# Patient Record
Sex: MALE | Race: WHITE | Employment: FULL TIME | URBAN - METROPOLITAN AREA
[De-identification: names, ages, dates, MRNs, and addresses within clinical notes are randomized per-mention and may not be internally consistent; named-entity substitution may affect disease eponyms.]

---

## 2017-01-07 ENCOUNTER — OFFICE VISIT (OUTPATIENT)
Dept: FAMILY MEDICINE CLINIC | Age: 52
End: 2017-01-07

## 2017-01-07 VITALS
DIASTOLIC BLOOD PRESSURE: 84 MMHG | WEIGHT: 270 LBS | RESPIRATION RATE: 16 BRPM | SYSTOLIC BLOOD PRESSURE: 128 MMHG | TEMPERATURE: 97 F | BODY MASS INDEX: 40.92 KG/M2 | OXYGEN SATURATION: 98 % | HEIGHT: 68 IN | HEART RATE: 76 BPM

## 2017-01-07 DIAGNOSIS — E29.1 HYPOGONADISM MALE: ICD-10-CM

## 2017-01-07 DIAGNOSIS — R73.03 PREDIABETES: ICD-10-CM

## 2017-01-07 DIAGNOSIS — I10 ESSENTIAL HYPERTENSION: ICD-10-CM

## 2017-01-07 DIAGNOSIS — J18.9 PNEUMONIA DUE TO INFECTIOUS ORGANISM, UNSPECIFIED LATERALITY, UNSPECIFIED PART OF LUNG: Primary | ICD-10-CM

## 2017-01-07 DIAGNOSIS — E78.2 MIXED HYPERLIPIDEMIA: ICD-10-CM

## 2017-01-07 PROCEDURE — 99214 OFFICE O/P EST MOD 30 MIN: CPT | Performed by: FAMILY MEDICINE

## 2017-01-07 RX ORDER — ACETAMINOPHEN AND CODEINE PHOSPHATE 300; 30 MG/1; MG/1
TABLET ORAL
Refills: 0 | COMMUNITY
Start: 2016-12-22 | End: 2018-06-04 | Stop reason: ALTCHOICE

## 2017-01-07 RX ORDER — CODEINE PHOSPHATE AND GUAIFENESIN 10; 100 MG/5ML; MG/5ML
5 SOLUTION ORAL 3 TIMES DAILY PRN
Qty: 420 ML | Refills: 0 | Status: SHIPPED | OUTPATIENT
Start: 2017-01-07 | End: 2018-06-04 | Stop reason: SDUPTHER

## 2017-01-07 RX ORDER — AMOXICILLIN AND CLAVULANATE POTASSIUM 875; 125 MG/1; MG/1
TABLET, FILM COATED ORAL
Refills: 0 | COMMUNITY
Start: 2016-12-28 | End: 2017-01-07 | Stop reason: ALTCHOICE

## 2017-01-07 RX ORDER — GUAIFENESIN AND CODEINE PHOSPHATE 100; 10 MG/5ML; MG/5ML
SYRUP ORAL
Refills: 0 | COMMUNITY
Start: 2016-12-17 | End: 2018-06-04 | Stop reason: SDUPTHER

## 2017-01-07 RX ORDER — ALBUTEROL SULFATE 2.5 MG/3ML
SOLUTION RESPIRATORY (INHALATION)
Refills: 1 | COMMUNITY
Start: 2016-12-20 | End: 2018-06-04 | Stop reason: ALTCHOICE

## 2017-01-07 RX ORDER — LANOLIN ALCOHOL/MO/W.PET/CERES
3 CREAM (GRAM) TOPICAL
COMMUNITY
Start: 2016-12-28 | End: 2018-06-04 | Stop reason: ALTCHOICE

## 2017-01-07 RX ORDER — MONTELUKAST SODIUM 10 MG/1
TABLET ORAL
Refills: 0 | COMMUNITY
Start: 2016-12-13 | End: 2018-06-04 | Stop reason: ALTCHOICE

## 2017-01-07 RX ORDER — PREDNISONE 20 MG/1
TABLET ORAL
Refills: 0 | COMMUNITY
Start: 2017-01-04 | End: 2018-06-04 | Stop reason: SDUPTHER

## 2017-03-17 DIAGNOSIS — I10 ESSENTIAL HYPERTENSION: ICD-10-CM

## 2017-03-18 RX ORDER — NEBIVOLOL HYDROCHLORIDE 5 MG/1
TABLET ORAL
Qty: 30 TABLET | Refills: 5 | Status: SHIPPED | OUTPATIENT
Start: 2017-03-18

## 2018-06-04 ENCOUNTER — OFFICE VISIT (OUTPATIENT)
Dept: FAMILY MEDICINE CLINIC | Age: 53
End: 2018-06-04
Payer: COMMERCIAL

## 2018-06-04 VITALS
WEIGHT: 245 LBS | BODY MASS INDEX: 37.13 KG/M2 | HEIGHT: 68 IN | RESPIRATION RATE: 16 BRPM | HEART RATE: 72 BPM | TEMPERATURE: 98.1 F | SYSTOLIC BLOOD PRESSURE: 126 MMHG | DIASTOLIC BLOOD PRESSURE: 80 MMHG

## 2018-06-04 DIAGNOSIS — L82.1 SEBORRHEIC KERATOSIS: ICD-10-CM

## 2018-06-04 DIAGNOSIS — I10 ESSENTIAL HYPERTENSION: Primary | ICD-10-CM

## 2018-06-04 DIAGNOSIS — N52.9 ERECTILE DYSFUNCTION, UNSPECIFIED ERECTILE DYSFUNCTION TYPE: ICD-10-CM

## 2018-06-04 DIAGNOSIS — R73.9 HYPERGLYCEMIA: ICD-10-CM

## 2018-06-04 DIAGNOSIS — Z12.11 COLON CANCER SCREENING: ICD-10-CM

## 2018-06-04 DIAGNOSIS — E78.2 MIXED HYPERLIPIDEMIA: ICD-10-CM

## 2018-06-04 DIAGNOSIS — E29.1 HYPOGONADISM MALE: ICD-10-CM

## 2018-06-04 PROCEDURE — G8427 DOCREV CUR MEDS BY ELIG CLIN: HCPCS | Performed by: FAMILY MEDICINE

## 2018-06-04 PROCEDURE — 3017F COLORECTAL CA SCREEN DOC REV: CPT | Performed by: FAMILY MEDICINE

## 2018-06-04 PROCEDURE — 99214 OFFICE O/P EST MOD 30 MIN: CPT | Performed by: FAMILY MEDICINE

## 2018-06-04 PROCEDURE — 1036F TOBACCO NON-USER: CPT | Performed by: FAMILY MEDICINE

## 2018-06-04 PROCEDURE — G8417 CALC BMI ABV UP PARAM F/U: HCPCS | Performed by: FAMILY MEDICINE

## 2018-06-04 RX ORDER — TADALAFIL 5 MG/1
5 TABLET ORAL PRN
Qty: 30 TABLET | Refills: 5 | Status: SHIPPED | OUTPATIENT
Start: 2018-06-04 | End: 2019-03-08 | Stop reason: SDUPTHER

## 2018-06-04 ASSESSMENT — PATIENT HEALTH QUESTIONNAIRE - PHQ9
1. LITTLE INTEREST OR PLEASURE IN DOING THINGS: 0
SUM OF ALL RESPONSES TO PHQ QUESTIONS 1-9: 0
2. FEELING DOWN, DEPRESSED OR HOPELESS: 0
SUM OF ALL RESPONSES TO PHQ9 QUESTIONS 1 & 2: 0

## 2018-06-09 DIAGNOSIS — R73.9 HYPERGLYCEMIA: ICD-10-CM

## 2018-06-09 DIAGNOSIS — E78.2 MIXED HYPERLIPIDEMIA: ICD-10-CM

## 2018-06-09 DIAGNOSIS — E29.1 HYPOGONADISM MALE: ICD-10-CM

## 2018-06-09 LAB
ALBUMIN SERPL-MCNC: 4.6 G/DL (ref 3.9–4.9)
ALP BLD-CCNC: 61 U/L (ref 35–104)
ALT SERPL-CCNC: 25 U/L (ref 0–41)
ANION GAP SERPL CALCULATED.3IONS-SCNC: 21 MEQ/L (ref 7–13)
AST SERPL-CCNC: 15 U/L (ref 0–40)
BILIRUB SERPL-MCNC: 0.3 MG/DL (ref 0–1.2)
BUN BLDV-MCNC: 20 MG/DL (ref 6–20)
CALCIUM SERPL-MCNC: 9.9 MG/DL (ref 8.6–10.2)
CHLORIDE BLD-SCNC: 101 MEQ/L (ref 98–107)
CHOLESTEROL, TOTAL: 217 MG/DL (ref 0–199)
CO2: 21 MEQ/L (ref 22–29)
CREAT SERPL-MCNC: 0.76 MG/DL (ref 0.7–1.2)
GFR AFRICAN AMERICAN: >60
GFR NON-AFRICAN AMERICAN: >60
GLOBULIN: 2.6 G/DL (ref 2.3–3.5)
GLUCOSE BLD-MCNC: 62 MG/DL (ref 74–109)
HBA1C MFR BLD: 5.3 % (ref 4.8–5.9)
HCT VFR BLD CALC: 45.3 % (ref 42–52)
HDLC SERPL-MCNC: 72 MG/DL (ref 40–59)
HEMOGLOBIN: 15.3 G/DL (ref 14–18)
LDL CHOLESTEROL CALCULATED: 133 MG/DL (ref 0–129)
MCH RBC QN AUTO: 29.2 PG (ref 27–31.3)
MCHC RBC AUTO-ENTMCNC: 33.7 % (ref 33–37)
MCV RBC AUTO: 86.5 FL (ref 80–100)
PDW BLD-RTO: 13.7 % (ref 11.5–14.5)
PLATELET # BLD: 330 K/UL (ref 130–400)
POTASSIUM SERPL-SCNC: 4.4 MEQ/L (ref 3.5–5.1)
RBC # BLD: 5.24 M/UL (ref 4.7–6.1)
SODIUM BLD-SCNC: 143 MEQ/L (ref 132–144)
TOTAL PROTEIN: 7.2 G/DL (ref 6.4–8.1)
TRIGL SERPL-MCNC: 61 MG/DL (ref 0–200)
WBC # BLD: 7.2 K/UL (ref 4.8–10.8)

## 2018-06-11 LAB
SEX HORMONE BINDING GLOBULIN: 40 NMOL/L (ref 11–80)
TESTOSTERONE FREE PERCENT: 1.5 % (ref 1.6–2.9)
TESTOSTERONE FREE, CALC: 19 PG/ML (ref 47–244)
TESTOSTERONE TOTAL-MALE: 122 NG/DL (ref 300–890)

## 2019-03-08 DIAGNOSIS — N52.9 ERECTILE DYSFUNCTION, UNSPECIFIED ERECTILE DYSFUNCTION TYPE: ICD-10-CM

## 2019-03-08 RX ORDER — TADALAFIL 5 MG/1
5 TABLET ORAL PRN
Qty: 30 TABLET | Refills: 5 | Status: SHIPPED | OUTPATIENT
Start: 2019-03-08

## 2019-05-06 ENCOUNTER — TELEPHONE (OUTPATIENT)
Dept: FAMILY MEDICINE CLINIC | Age: 54
End: 2019-05-06

## 2023-02-01 PROBLEM — R74.8 ELEVATED LIVER ENZYMES: Status: ACTIVE | Noted: 2023-02-01

## 2023-02-01 PROBLEM — K92.1 HEMATOCHEZIA: Status: ACTIVE | Noted: 2023-02-01

## 2023-02-01 PROBLEM — I10 ESSENTIAL HYPERTENSION, BENIGN: Status: ACTIVE | Noted: 2023-02-01

## 2023-02-01 PROBLEM — F43.10 POST-TRAUMATIC STRESS DISORDER, UNSPECIFIED: Status: ACTIVE | Noted: 2023-02-01

## 2023-02-01 PROBLEM — E78.5 HYPERLIPIDEMIA: Status: ACTIVE | Noted: 2023-02-01

## 2023-02-01 PROBLEM — N52.9 ERECTILE DYSFUNCTION: Status: ACTIVE | Noted: 2023-02-01

## 2023-02-01 PROBLEM — E29.1 HYPOGONADISM MALE: Status: ACTIVE | Noted: 2023-02-01

## 2023-02-01 PROBLEM — R73.9 HYPERGLYCEMIA: Status: ACTIVE | Noted: 2023-02-01

## 2023-02-01 PROBLEM — G47.33 OBSTRUCTIVE SLEEP APNEA: Status: ACTIVE | Noted: 2023-02-01

## 2023-02-01 PROBLEM — F32.9 DEPRESSION, MAJOR: Status: ACTIVE | Noted: 2023-02-01

## 2023-02-01 PROBLEM — H91.93 DECREASED HEARING OF BOTH EARS: Status: ACTIVE | Noted: 2023-02-01

## 2023-02-01 RX ORDER — SILDENAFIL 100 MG/1
TABLET, FILM COATED ORAL
COMMUNITY
Start: 2020-10-05

## 2023-02-01 RX ORDER — METOPROLOL SUCCINATE 100 MG/1
1 TABLET, EXTENDED RELEASE ORAL DAILY
COMMUNITY
Start: 2020-10-14

## 2023-02-01 RX ORDER — TESTOSTERONE CYPIONATE 200 MG/ML
100 INJECTION, SOLUTION INTRAMUSCULAR
COMMUNITY
Start: 2022-07-26

## 2023-02-01 RX ORDER — BUPROPION HYDROCHLORIDE 150 MG/1
1 TABLET ORAL DAILY
COMMUNITY

## 2023-02-01 RX ORDER — VALSARTAN AND HYDROCHLOROTHIAZIDE 160; 12.5 MG/1; MG/1
1 TABLET, FILM COATED ORAL DAILY
COMMUNITY

## 2024-10-04 ENCOUNTER — OFFICE VISIT (OUTPATIENT)
Dept: ORTHOPEDIC SURGERY | Facility: CLINIC | Age: 59
End: 2024-10-04
Payer: COMMERCIAL

## 2024-10-04 ENCOUNTER — HOSPITAL ENCOUNTER (OUTPATIENT)
Dept: RADIOLOGY | Facility: CLINIC | Age: 59
Discharge: HOME | End: 2024-10-04
Payer: COMMERCIAL

## 2024-10-04 DIAGNOSIS — M25.461 EFFUSION, RIGHT KNEE: ICD-10-CM

## 2024-10-04 DIAGNOSIS — M17.11 PRIMARY OSTEOARTHRITIS OF RIGHT KNEE: Primary | ICD-10-CM

## 2024-10-04 DIAGNOSIS — M25.561 RIGHT KNEE PAIN, UNSPECIFIED CHRONICITY: ICD-10-CM

## 2024-10-04 PROCEDURE — 20610 DRAIN/INJ JOINT/BURSA W/O US: CPT | Mod: RT | Performed by: ORTHOPAEDIC SURGERY

## 2024-10-04 PROCEDURE — 2500000004 HC RX 250 GENERAL PHARMACY W/ HCPCS (ALT 636 FOR OP/ED): Performed by: ORTHOPAEDIC SURGERY

## 2024-10-04 PROCEDURE — 99213 OFFICE O/P EST LOW 20 MIN: CPT | Performed by: ORTHOPAEDIC SURGERY

## 2024-10-04 PROCEDURE — 73564 X-RAY EXAM KNEE 4 OR MORE: CPT | Mod: RT

## 2024-10-04 RX ORDER — LIDOCAINE HYDROCHLORIDE 10 MG/ML
1 INJECTION, SOLUTION INFILTRATION; PERINEURAL
Status: COMPLETED | OUTPATIENT
Start: 2024-10-04 | End: 2024-10-04

## 2024-10-04 RX ORDER — MELOXICAM 15 MG/1
15 TABLET ORAL
Qty: 30 TABLET | Refills: 2 | Status: SHIPPED | OUTPATIENT
Start: 2024-10-04

## 2024-10-04 RX ORDER — BETAMETHASONE SODIUM PHOSPHATE AND BETAMETHASONE ACETATE 3; 3 MG/ML; MG/ML
6 INJECTION, SUSPENSION INTRA-ARTICULAR; INTRALESIONAL; INTRAMUSCULAR; SOFT TISSUE
Status: COMPLETED | OUTPATIENT
Start: 2024-10-04 | End: 2024-10-04

## 2024-10-04 NOTE — PROGRESS NOTES
History of Present Illness  Chief Complaint   Patient presents with    Right Knee - New Patient Visit     Xrays today        The patient presents with side: right knee pain for 2 weeks.  The patient complains of worsening pain over the past 2 weeks.  Recently there has been concern for falls and instability.  There is increasing difficulty with activities of daily living and significant disability related to the knee pain.  The patient endorses the following non-operative treatments: Rest, ice, elevation and Ultram .   There is increasing frustration with persistent pain and swelling and decreasing distance of ambulation.    The patient was seen in the emergency department, treated and released    History reviewed. No pertinent past medical history.    Medication Documentation Review Audit       Reviewed by Meagan Quesada MA (Medical Assistant) on 10/04/24 at 1307      Medication Order Taking? Sig Documenting Provider Last Dose Status   buPROPion XL (Wellbutrin XL) 150 mg 24 hr tablet [id number]  Take 1 tablet (150 mg) by mouth 1 (one) time each day. Do not crush, chew, or split. Historical Provider, MD  Active   metoprolol succinate XL (Toprol-XL) 100 mg 24 hr tablet [id number]  Take 1 tablet (100 mg) by mouth 1 (one) time each day. Historical Provider, MD  Active   sildenafil (Viagra) 100 mg tablet [id number]  TK 1 T PO PRF ERECTILE DYSFUNCTION Historical Provider, MD  Active   testosterone cypionate (Depo-Testosterone) 200 mg/mL injection [id number]  Inject 0.5 mL (100 mg) into the shoulder, thigh, or buttocks every 14 (fourteen) days. Historical Provider, MD  Active   valsartan-hydrochlorothiazide (Diovan-HCT) 160-12.5 mg tablet [id number]  Take 1 tablet by mouth 1 (one) time each day. Historical Provider, MD  Active                    Allergies   Allergen Reactions    Hydromorphone Unknown       Social History     Socioeconomic History    Marital status:      Spouse name: Not on file    Number of children: Not on  file    Years of education: Not on file    Highest education level: Not on file   Occupational History    Not on file   Tobacco Use    Smoking status: Not on file    Smokeless tobacco: Not on file   Substance and Sexual Activity    Alcohol use: Not on file    Drug use: Not on file    Sexual activity: Not on file   Other Topics Concern    Not on file   Social History Narrative    Not on file     Social Determinants of Health     Financial Resource Strain: Not on file   Food Insecurity: Not on file   Transportation Needs: Not on file   Physical Activity: Not on file   Stress: Not on file   Social Connections: Not on file   Intimate Partner Violence: Not on file   Housing Stability: Not on file       Past Surgical History:   Procedure Laterality Date    COLONOSCOPY  2011    FINGER AMPUTATION      HERNIA REPAIR      RHINOPLASTY      ROTATOR CUFF REPAIR      VARICOCELECTOMY      VASECTOMY           Pain is described as aching and sharp     Location: medial, popliteral  Pain level: 7  Assistive device: is not using any ambulatory assistive devices  History of surgery: None       Review of Systems   GENERAL: Negative for malaise, significant weight loss, fever  MUSCULOSKELETAL: see HPI  NEURO:  Negative     Exam  side: right Knee:  Skin healthy and intact  No gross swelling or ecchymosis  Alignment: mild varus    Correctable: full     Effusion: moderate      ROM:  5-95 degrees  mild Crepitus with range of motion  Tenderness to palpation over medial joint line and with patellar compression      No laxity to valgus stress  No laxity to varus stress  Negative Lachman´s test  Negative posterior drawer test  negative Chon´s test  Logrolling of hip negative  No pain with internal rotation of the hip  Straight leg raise negative  Neurovascular exam normal distally  2+ DP pulse and good cap refill     Radiographs  XR knee right 4+ views  Interpreted By:  Naveed Ramon,   STUDY:  XR KNEE RIGHT 4+ VIEWS; ; 10/4/2024 1:21 pm       INDICATION:  Signs/Symptoms:pain.      ACCESSION NUMBER(S):  [id number]      ORDERING CLINICIAN:  NAVEED RAMON      FINDINGS:  Right knee films are negative for fracture, dislocation or  destructive lesion. There is moderate degenerative change with joint  space narrowing and sclerosis. There is some spurring off the patella.          Signed by: Naveed Ramon 10/4/2024 1:23 PM  Dictation workstation:   [id number]         Assessment  Osteoarthrosis side: right knee      Plan  We discussed with the patient the diagnosis of degenerative joint disease of the knee.  We reviewed an evidence-based approach to osteoarthritis of the knee.  We strongly encouraged low-impact aerobic activity and non-opioid analgesics.     We discussed temporary pain relief with corticosteroid injections and the associated risks.  We also discussed the conflicting evidence regarding viscosupplementation and potential long-term risks with NSAID´s.  We reviewed the role of bracing for instability and physical therapy for atrophy and gait abnormalities.  We reviewed that the only curative process is for a joint arthroplasty.  The patient elected for NSAIDS and Injections  Mobic was sent to the pharmacy  I offered to aspirate and inject the knee with cortisone today which she wished to try.    I will see them in 1 month for recheck, sooner if there is any problems.  Questions were answered.    L Inj/Asp: R knee on 10/4/2024 1:43 PM  Indications: pain  Details: 21 G needle, superolateral approach  Medications: 1 mL lidocaine 10 mg/mL (1 %); 6 mg betamethasone acet,sod phos 6 mg/mL  Aspirate: 40 mL serous  Outcome: tolerated well, no immediate complications  Procedure, treatment alternatives, risks and benefits explained, specific risks discussed. Consent was given by the patient. Immediately prior to procedure a time out was called to verify the correct patient, procedure, equipment, support staff and site/side marked as required. Patient was  prepped and draped in the usual sterile fashion.

## 2024-11-01 ENCOUNTER — APPOINTMENT (OUTPATIENT)
Dept: ORTHOPEDIC SURGERY | Facility: CLINIC | Age: 59
End: 2024-11-01
Payer: COMMERCIAL

## 2024-11-01 ENCOUNTER — OFFICE VISIT (OUTPATIENT)
Dept: ORTHOPEDIC SURGERY | Facility: CLINIC | Age: 59
End: 2024-11-01
Payer: COMMERCIAL

## 2024-11-01 DIAGNOSIS — M17.11 PRIMARY OSTEOARTHRITIS OF RIGHT KNEE: Primary | ICD-10-CM

## 2024-11-01 PROCEDURE — 99213 OFFICE O/P EST LOW 20 MIN: CPT | Performed by: ORTHOPAEDIC SURGERY

## 2024-11-19 ENCOUNTER — APPOINTMENT (OUTPATIENT)
Dept: ORTHOPEDIC SURGERY | Facility: CLINIC | Age: 59
End: 2024-11-19
Payer: COMMERCIAL

## 2024-11-19 DIAGNOSIS — M17.11 PRIMARY OSTEOARTHRITIS OF RIGHT KNEE: Primary | ICD-10-CM

## 2024-11-19 PROCEDURE — 20610 DRAIN/INJ JOINT/BURSA W/O US: CPT | Performed by: ORTHOPAEDIC SURGERY

## 2024-11-19 NOTE — PROGRESS NOTES
History of Present Illness   Right knee Synvisc injection 2 of 3     Review of Systems   GENERAL: Negative for malaise, significant weight loss, fever  MUSCULOSKELETAL: see HPI  NEURO:  Negative     No past medical history on file.     Medication Documentation Review Audit       Reviewed by Meagan Quesada MA (Medical Assistant) on 10/04/24 at 1307      Medication Order Taking? Sig Documenting Provider Last Dose Status   buPROPion XL (Wellbutrin XL) 150 mg 24 hr tablet [id number]  Take 1 tablet (150 mg) by mouth 1 (one) time each day. Do not crush, chew, or split. Historical Provider, MD  Active   metoprolol succinate XL (Toprol-XL) 100 mg 24 hr tablet [id number]  Take 1 tablet (100 mg) by mouth 1 (one) time each day. Historical Provider, MD  Active   sildenafil (Viagra) 100 mg tablet [id number]  TK 1 T PO PRF ERECTILE DYSFUNCTION Historical Provider, MD  Active   testosterone cypionate (Depo-Testosterone) 200 mg/mL injection [id number]  Inject 0.5 mL (100 mg) into the shoulder, thigh, or buttocks every 14 (fourteen) days. Historical Provider, MD  Active   valsartan-hydrochlorothiazide (Diovan-HCT) 160-12.5 mg tablet [id number]  Take 1 tablet by mouth 1 (one) time each day. Historical Provider, MD  Active                     Physical Exam  Right knee  Skin is intact without any evidence of erythema ecchymosis or effusion     Imaging  XR knee right 4+ views  Interpreted By:  Naveed Ramon,   STUDY:  XR KNEE RIGHT 4+ VIEWS; ; 10/4/2024 1:21 pm      INDICATION:  Signs/Symptoms:pain.      ACCESSION NUMBER(S):  [id number]      ORDERING CLINICIAN:  NAVEED RAMON      FINDINGS:  Right knee films are negative for fracture, dislocation or  destructive lesion. There is moderate degenerative change with joint  space narrowing and sclerosis. There is some spurring off the patella.          Signed by: Naveed Ramon 10/4/2024 1:23 PM  Dictation workstation:   [id number]       Assessment   Right knee osteoarthritis     Plan  Right knee  Synvisc injection 2 of 3.  Follow-up next week for injection 3 of 3.  All questions answered.    L Inj/Asp: R knee on 11/19/2024 1:49 PM  Indications: pain  Details: 21 G needle, anterolateral approach  Medications: 16 mg hylan 16 mg/2 mL  Outcome: tolerated well, no immediate complications  Procedure, treatment alternatives, risks and benefits explained, specific risks discussed. Consent was given by the patient. Immediately prior to procedure a time out was called to verify the correct patient, procedure, equipment, support staff and site/side marked as required. Patient was prepped and draped in the usual sterile fashion.

## 2024-11-26 ENCOUNTER — APPOINTMENT (OUTPATIENT)
Dept: ORTHOPEDIC SURGERY | Facility: CLINIC | Age: 59
End: 2024-11-26
Payer: COMMERCIAL

## 2024-11-26 DIAGNOSIS — M17.11 PRIMARY OSTEOARTHRITIS OF RIGHT KNEE: Primary | ICD-10-CM

## 2024-11-26 PROCEDURE — 20610 DRAIN/INJ JOINT/BURSA W/O US: CPT | Performed by: ORTHOPAEDIC SURGERY

## 2024-11-26 NOTE — PROGRESS NOTES
History of Present Illness   The patient is here for the second Synvisc injection to the right knee     Review of Systems   GENERAL: Negative for malaise, significant weight loss, fever  MUSCULOSKELETAL: see HPI  NEURO:  Negative     No past medical history on file.     Medication Documentation Review Audit       Reviewed by Meagan Quesada MA (Medical Assistant) on 10/04/24 at 1307      Medication Order Taking? Sig Documenting Provider Last Dose Status   buPROPion XL (Wellbutrin XL) 150 mg 24 hr tablet [id number]  Take 1 tablet (150 mg) by mouth 1 (one) time each day. Do not crush, chew, or split. Historical Provider, MD  Active   metoprolol succinate XL (Toprol-XL) 100 mg 24 hr tablet [id number]  Take 1 tablet (100 mg) by mouth 1 (one) time each day. Historical Provider, MD  Active   sildenafil (Viagra) 100 mg tablet [id number]  TK 1 T PO PRF ERECTILE DYSFUNCTION Historical Provider, MD  Active   testosterone cypionate (Depo-Testosterone) 200 mg/mL injection [id number]  Inject 0.5 mL (100 mg) into the shoulder, thigh, or buttocks every 14 (fourteen) days. Historical Provider, MD  Active   valsartan-hydrochlorothiazide (Diovan-HCT) 160-12.5 mg tablet [id number]  Take 1 tablet by mouth 1 (one) time each day. Historical Provider, MD  Active                     Physical Exam  The skin is intact about the right knee, there is no erythema or warmth     Imaging  XR knee right 4+ views  Interpreted By:  Naveed Ramon,   STUDY:  XR KNEE RIGHT 4+ VIEWS; ; 10/4/2024 1:21 pm      INDICATION:  Signs/Symptoms:pain.      ACCESSION NUMBER(S):  [id number]      ORDERING CLINICIAN:  NAVEED RAMON      FINDINGS:  Right knee films are negative for fracture, dislocation or  destructive lesion. There is moderate degenerative change with joint  space narrowing and sclerosis. There is some spurring off the patella.          Signed by: Naveed Ramon 10/4/2024 1:23 PM  Dictation workstation:   [id number]       Assessment   Osteoarthrosis right  knee     Plan  Second Synvisc injection right knee f  Follow-up next week for the third      L Inj/Asp: R knee on 11/26/2024 1:50 PM  Indications: pain  Details: 21 G needle, anterolateral approach  Medications: 16 mg hylan 16 mg/2 mL  Outcome: tolerated well, no immediate complications  Procedure, treatment alternatives, risks and benefits explained, specific risks discussed. Consent was given by the patient. Immediately prior to procedure a time out was called to verify the correct patient, procedure, equipment, support staff and site/side marked as required. Patient was prepped and draped in the usual sterile fashion.

## 2024-12-03 ENCOUNTER — APPOINTMENT (OUTPATIENT)
Dept: ORTHOPEDIC SURGERY | Facility: CLINIC | Age: 59
End: 2024-12-03
Payer: COMMERCIAL

## 2024-12-03 DIAGNOSIS — M17.11 PRIMARY OSTEOARTHRITIS OF RIGHT KNEE: Primary | ICD-10-CM

## 2024-12-03 PROCEDURE — 20610 DRAIN/INJ JOINT/BURSA W/O US: CPT | Performed by: ORTHOPAEDIC SURGERY

## 2024-12-03 NOTE — PROGRESS NOTES
History of Present Illness   Right knee Synvisc injection 3 of 3.     Review of Systems   GENERAL: Negative for malaise, significant weight loss, fever  MUSCULOSKELETAL: see HPI  NEURO:  Negative     No past medical history on file.     Medication Documentation Review Audit       Reviewed by Meagan Quesada MA (Medical Assistant) on 10/04/24 at 1307      Medication Order Taking? Sig Documenting Provider Last Dose Status   buPROPion XL (Wellbutrin XL) 150 mg 24 hr tablet [id number]  Take 1 tablet (150 mg) by mouth 1 (one) time each day. Do not crush, chew, or split. Historical Provider, MD  Active   metoprolol succinate XL (Toprol-XL) 100 mg 24 hr tablet [id number]  Take 1 tablet (100 mg) by mouth 1 (one) time each day. Historical Provider, MD  Active   sildenafil (Viagra) 100 mg tablet [id number]  TK 1 T PO PRF ERECTILE DYSFUNCTION Historical Provider, MD  Active   testosterone cypionate (Depo-Testosterone) 200 mg/mL injection [id number]  Inject 0.5 mL (100 mg) into the shoulder, thigh, or buttocks every 14 (fourteen) days. Historical Provider, MD  Active   valsartan-hydrochlorothiazide (Diovan-HCT) 160-12.5 mg tablet [id number]  Take 1 tablet by mouth 1 (one) time each day. Historical Provider, MD  Active                     Physical Exam  Right knee  Skin is intact without any evidence of erythema ecchymosis or effusion     Imaging  XR knee right 4+ views  Interpreted By:  Naveed Ramon,   STUDY:  XR KNEE RIGHT 4+ VIEWS; ; 10/4/2024 1:21 pm      INDICATION:  Signs/Symptoms:pain.      ACCESSION NUMBER(S):  [id number]      ORDERING CLINICIAN:  NAVEED RAMON      FINDINGS:  Right knee films are negative for fracture, dislocation or  destructive lesion. There is moderate degenerative change with joint  space narrowing and sclerosis. There is some spurring off the patella.          Signed by: Naveed Ramon 10/4/2024 1:23 PM  Dictation workstation:   [id number]       Assessment   Right knee osteoarthritis     Plan  Right knee  Synvisc injection 3 of 3.  Follow-up in 2 months for reevaluation.  All questions answered.    L Inj/Asp: R knee on 12/3/2024 1:52 PM  Indications: pain  Details: 21 G needle, anterolateral approach  Medications: 16 mg hylan 16 mg/2 mL  Outcome: tolerated well, no immediate complications  Procedure, treatment alternatives, risks and benefits explained, specific risks discussed. Consent was given by the patient. Immediately prior to procedure a time out was called to verify the correct patient, procedure, equipment, support staff and site/side marked as required. Patient was prepped and draped in the usual sterile fashion.

## 2025-02-04 ENCOUNTER — APPOINTMENT (OUTPATIENT)
Dept: ORTHOPEDIC SURGERY | Facility: CLINIC | Age: 60
End: 2025-02-04
Payer: COMMERCIAL

## 2025-04-28 DIAGNOSIS — R73.9 HYPERGLYCEMIA: ICD-10-CM

## 2025-04-28 DIAGNOSIS — I10 ESSENTIAL HYPERTENSION, BENIGN: ICD-10-CM

## 2025-07-15 ENCOUNTER — OFFICE VISIT (OUTPATIENT)
Dept: PRIMARY CARE | Facility: CLINIC | Age: 60
End: 2025-07-15
Payer: COMMERCIAL

## 2025-07-15 VITALS
BODY MASS INDEX: 46.62 KG/M2 | HEIGHT: 67 IN | WEIGHT: 297 LBS | HEART RATE: 68 BPM | SYSTOLIC BLOOD PRESSURE: 120 MMHG | TEMPERATURE: 97.8 F | RESPIRATION RATE: 16 BRPM | OXYGEN SATURATION: 96 % | DIASTOLIC BLOOD PRESSURE: 68 MMHG

## 2025-07-15 DIAGNOSIS — E29.1 HYPOGONADISM MALE: ICD-10-CM

## 2025-07-15 DIAGNOSIS — Z00.00 ANNUAL PHYSICAL EXAM: ICD-10-CM

## 2025-07-15 DIAGNOSIS — E78.2 MIXED HYPERLIPIDEMIA: ICD-10-CM

## 2025-07-15 DIAGNOSIS — R73.9 HYPERGLYCEMIA: ICD-10-CM

## 2025-07-15 DIAGNOSIS — I10 ESSENTIAL HYPERTENSION, BENIGN: ICD-10-CM

## 2025-07-15 DIAGNOSIS — F43.10 POST-TRAUMATIC STRESS DISORDER, UNSPECIFIED: ICD-10-CM

## 2025-07-15 DIAGNOSIS — N52.9 ERECTILE DYSFUNCTION, UNSPECIFIED ERECTILE DYSFUNCTION TYPE: ICD-10-CM

## 2025-07-15 DIAGNOSIS — G47.33 OBSTRUCTIVE SLEEP APNEA: ICD-10-CM

## 2025-07-15 DIAGNOSIS — F32.9 MAJOR DEPRESSIVE DISORDER, REMISSION STATUS UNSPECIFIED, UNSPECIFIED WHETHER RECURRENT: ICD-10-CM

## 2025-07-15 PROCEDURE — 1036F TOBACCO NON-USER: CPT | Performed by: FAMILY MEDICINE

## 2025-07-15 PROCEDURE — 3074F SYST BP LT 130 MM HG: CPT | Performed by: FAMILY MEDICINE

## 2025-07-15 PROCEDURE — 99214 OFFICE O/P EST MOD 30 MIN: CPT | Performed by: FAMILY MEDICINE

## 2025-07-15 PROCEDURE — 3008F BODY MASS INDEX DOCD: CPT | Performed by: FAMILY MEDICINE

## 2025-07-15 PROCEDURE — 3078F DIAST BP <80 MM HG: CPT | Performed by: FAMILY MEDICINE

## 2025-07-15 RX ORDER — TAMSULOSIN HYDROCHLORIDE 0.4 MG/1
0.4 CAPSULE ORAL
COMMUNITY
Start: 2025-01-07

## 2025-07-15 RX ORDER — VALSARTAN 320 MG/1
320 TABLET ORAL DAILY
COMMUNITY
Start: 2025-01-07

## 2025-07-15 RX ORDER — TADALAFIL 20 MG/1
20 TABLET ORAL DAILY PRN
Qty: 6 TABLET | Refills: 3 | Status: SHIPPED | OUTPATIENT
Start: 2025-07-15 | End: 2026-07-15

## 2025-07-15 RX ORDER — PROPRANOLOL HYDROCHLORIDE 40 MG/1
40 TABLET ORAL
COMMUNITY
Start: 2024-08-28

## 2025-07-15 RX ORDER — ALBUTEROL SULFATE 90 UG/1
INHALANT RESPIRATORY (INHALATION)
COMMUNITY

## 2025-07-15 ASSESSMENT — PATIENT HEALTH QUESTIONNAIRE - PHQ9
2. FEELING DOWN, DEPRESSED OR HOPELESS: SEVERAL DAYS
SUM OF ALL RESPONSES TO PHQ9 QUESTIONS 1 AND 2: 2
1. LITTLE INTEREST OR PLEASURE IN DOING THINGS: SEVERAL DAYS

## 2025-07-15 NOTE — PROGRESS NOTES
Covid vax: declined  Flu: declined  Shingles: declined    CRC: 2025 at [facility]-polyps removed -repeat 2 years

## 2025-07-15 NOTE — PROGRESS NOTES
"Subjective   Patient ID: Sonido Castillo is a 60 y.o. male who presents for   Chief Complaint   Patient presents with    Annual Exam     [facility]  Wants to get metals tested  Has been doing a parasite detox    Hypertension     Unsure about meds-will bring updated list at next OV     Sleep Apnea     Using and benefiting from cpap  Needs new machine and supplies  Discuss increasing setting-still snoring    Erectile Dysfunction     Discuss options other than viagra    Hypogonadism     Discuss restarting testosterone   Covid vax: declined  Flu: declined  Shingles: declined     CRC: 2025 at [facility]-polyps removed -repeat 2 years  Kids doing well  Oldest [employer]/mid su   Ccs 0        HPI  Problem List[1]    Surgical History[2]    Review of Systems  This patient has  NO history of seizures/ CAD or CVA    NO history of recent Covid nor flu symptoms,    NO Fever nor chills today,    NO Chest pain, shortness of breath nor paroxysmal nocturnal dyspnea,  NO Nausea, vomiting, nor diarrhea,  NO Hematochezia nor melena,  NO Dysuria, hematuria, nor new incontinence issues  NO new severe headaches nor neurological complaints,  NO new issues with anxiety nor depression nor new psychiatric complaints,  NO suicidal nor homicidal ideations.     OBJECTIVE:  /68   Pulse 68   Temp 36.6 °C (97.8 °F) (Temporal)   Resp 16   Ht 1.689 m (5' 6.5\")   Wt 135 kg (297 lb)   SpO2 96%   BMI 47.22 kg/m²      General:  alert, oriented, no acute distress.  No obvious skin rashes noted.       Mood is pleasant,  no signs of emotional distress.   Not appearing intoxicated or altered.   No voiced delusions,   Normal, appropriate behavior.    HEENT: Normocephalic, atraumatic,   Pupils round, reactive to light  Extraocular motions intact and wnl  Tympanic membranes normal    Neck: no nuchal rigidity  No masses palpable.  No carotid bruits.  No thyromegaly.    Respiratory: Equal breath sounds  No wheezes,    rales,    nor rhonchi  No " respiratory distress.    Heart: Regular rate and rhythm, no    murmurs  no rubs/gallops    Abdomen: no masses palpable, nontender, no rebound nor guarding.ovwt    Extremities: NO cyanosis noted, no clubbing.   No edema noted.  2+dorsalis pedis pulses.    Normal-not antalgic, steady gait.    No visits with results within 3 Month(s) from this visit.   Latest known visit with results is:   [facility] Encounter on 07/26/2022   Component Date Value Ref Range Status    DRUG SCREEN COMMENT URINE 07/26/2022 SEE BELOW   Final    Comment: Drug screen results are presumptive and should not be used to assess   compliance with prescribed medication. Definitive confirmatory drug testing   has been added to this sample for any positive screen result and will be   reported separately.   .  Toxicology screening results are reported qualitatively. The concentration   must be greater than or equal to the cutoff to be reported as positive. The   concentration at which the screening test can detect an individual drug or   metabolite varies. The absence of expected drug(s) and/or drug metabolite(s)   may indicate non-compliance, inappropriate timing of specimen collection   relative to drug administration, poor drug absorption, diluted/adulterated   urine, or limitations of testing. For medical purposes only; not valid for   forensic use.   .  Interpretive questions should be directed to the laboratory medical   directors.        Creatine, Urine 07/26/2022 108.5  mg/dL Final    Comment: A urine creatinine result >= 20 mg/dL is considered valid without suspicion   of dilution. Samples with results below this range will automatically reflex   to specific gravity testing to verify specimen integrity.       Amphetamine Screen, Urine 07/26/2022 PRESUMPTIVE NEGATIVE  NEGATIVE Final    Comment:  CUTOFF LEVEL:  500 NG/ML   Cross-reactivity has been reported with high concentrations   of the following drugs: buproprion, chloroquine, chlorpromazine,    ephedrine, mephentermine, fenfluramine, phentermine,   phenylpropanolamine, pseudoephedrine, and propranolol.      Barbiturate Screen, Urine 07/26/2022 PRESUMPTIVE NEGATIVE  NEGATIVE Final     CUTOFF LEVEL: 200 NG/ML    Cannabinoid Screen, Urine 07/26/2022 PRESUMPTIVE NEGATIVE  NEGATIVE Final     CUTOFF LEVEL: 50 NG/ML    Cocaine Screen, Urine 07/26/2022 PRESUMPTIVE NEGATIVE  NEGATIVE Final     CUTOFF LEVEL: 150 NG/ML    PCP Screen, Urine 07/26/2022 PRESUMPTIVE NEGATIVE  NEGATIVE Final    Comment:  CUTOFF LEVEL:  25 NG/ML   Cross-reactivity has been reported with dextromethorphan.      7-Aminoclonazepam 07/26/2022 <25  Cutoff <25 ng/mL Final    Alpha-Hydroxyalprazolam 07/26/2022 <25  Cutoff <25 ng/mL Final    Alpha-Hydroxymidazolam 07/26/2022 <25  Cutoff <25 ng/mL Final    Alprazolam 07/26/2022 <25  Cutoff <25 ng/mL Final    Chlordiazepoxide 07/26/2022 <25  Cutoff <25 ng/mL Final    Clonazepam 07/26/2022 <25  Cutoff <25 ng/mL Final    Diazepam 07/26/2022 <25  Cutoff <25 ng/mL Final    Lorazepam 07/26/2022 <25  Cutoff <25 ng/mL Final    Midazolam 07/26/2022 <25  Cutoff <25 ng/mL Final    Nordiazepam 07/26/2022 <25  Cutoff <25 ng/mL Final    Oxazepam 07/26/2022 <25  Cutoff <25 ng/mL Final    Temazepam 07/26/2022 <25  Cutoff <25 ng/mL Final    Comment:  The performance characteristics of the Benzodiazepine Confirmation,   Urine has been validated by the individual  laboratory site   where testing is performed. It has not been cleared or approved   by the FDA. However the FDA has determined that such clearance   or approval is not necessary. Our Laboratory is certified under   the Clinical Laboratory Improvement Amendments of 1988 (CLIA)   as qualified to perform high complexity clinical laboratory testing.      Zolpidem 07/26/2022 <25  Cutoff <25 ng/mL Final    Zolpidem Metabolite (ZCA) 07/26/2022 <25  Cutoff <25 ng/mL Final    Comment:  The performance characteristics of the Zolpidem Confirmation,   Urine has been  validated by the individual  laboratory site   where testing is performed. It has not been cleared or approved   by the FDA. However the FDA has determined that such clearance   or approval is not necessary. Our Laboratory is certified under   the Clinical Laboratory Improvement Amendments of 1988 (CLIA)   as qualified to perform high complexity clinical laboratory testing.      6-Acetylmorphine 07/26/2022 <25  Cutoff <25 ng/mL Final    Codeine 07/26/2022 <50  Cutoff <50 ng/mL Final    Hydrocodone 07/26/2022 <25  Cutoff <25 ng/mL Final    Hydromorphone 07/26/2022 <25  Cutoff <25 ng/mL Final    Morphine Urine 07/26/2022 <50  Cutoff <50 ng/mL Final    Norhydrocodone 07/26/2022 <25  Cutoff <25 ng/mL Final    Noroxycodone 07/26/2022 <25  Cutoff <25 ng/mL Final    Oxycodone 07/26/2022 <25  Cutoff <25 ng/mL Final    Oxymorphone 07/26/2022 <25  Cutoff <25 ng/mL Final    Comment:  The performance characteristics of the Opiate Confirmation,   Urine has been validated by the individual  laboratory site   where testing is performed. It has not been cleared or approved   by the FDA. However the FDA has determined that such clearance   or approval is not necessary. Our Laboratory is certified under   the Clinical Laboratory Improvement Amendments of 1988 (CLIA)   as qualified to perform high complexity clinical laboratory testing.      Tramadol 07/26/2022 <50  Cutoff <50 ng/mL Final    O-Desmethyltramadol 07/26/2022 <50  Cutoff <50 ng/mL Final    Comment:  The performance characteristics of the Tramadol Confirmation, Urine   has been validated by the individual  laboratory site where   testing is performed. It has not been cleared or approved by the   FDA.  However the FDA has determined that such clearance or approval   is not necessary. Our Laboratory is certified under the Clinical   Laboratory Improvement Amendments of 1988 (CLIA) as qualified    to perform high complexity clinical laboratory testing.      Fentanyl  07/26/2022 <2.5  Cutoff<2.5 ng/mL Final    Norfentanyl 07/26/2022 <2.5  Cutoff<2.5 ng/mL Final    Comment:  The performance characteristics of the Fentanyl Confirmation,   Urine has been validated by the individual  laboratory site   where testing is performed. It has not been cleared or approved   by the FDA. However the FDA has determined that such clearance   or approval is not necessary. Our Laboratory is certified under   the Clinical Laboratory Improvement Amendments of 1988 (CLIA)   as qualified to perform high complexity clinical laboratory testing.      METHADONE CONFIRMATION,URINE 07/26/2022 <25  Cutoff <25 ng/mL Final    EDDP 07/26/2022 <25  Cutoff <25 ng/mL Final    Comment:  The performance characteristics of the Methadone Confirmation,   Urine has been validated by the individual  laboratory site   where testing is performed. It has not been cleared or approved   by the FDA. However the FDA has determined that such clearance   or approval is not necessary. Our Laboratory is certified under   the Clinical Laboratory Improvement Amendments of 1988 (CLIA)   as qualified to perform high complexity clinical laboratory testing.          Assessment/Plan     Problem List Items Addressed This Visit       Erectile dysfunction    Relevant Orders    Comprehensive Metabolic Panel    CBC and Auto Differential    Hemoglobin A1C    Lipid Panel    Prostate Specific Antigen, Screen    Thyroxine, Free    Thyroid Stimulating Hormone    Essential hypertension, benign    Relevant Orders    Comprehensive Metabolic Panel    CBC and Auto Differential    Hemoglobin A1C    Lipid Panel    Prostate Specific Antigen, Screen    Thyroxine, Free    Thyroid Stimulating Hormone    Hyperglycemia    Relevant Orders    Comprehensive Metabolic Panel    CBC and Auto Differential    Hemoglobin A1C    Lipid Panel    Prostate Specific Antigen, Screen    Thyroxine, Free    Thyroid Stimulating Hormone    Hyperlipidemia    Relevant Orders     Comprehensive Metabolic Panel    CBC and Auto Differential    Hemoglobin A1C    Lipid Panel    Prostate Specific Antigen, Screen    Thyroxine, Free    Thyroid Stimulating Hormone    Hypogonadism male    Relevant Orders    Comprehensive Metabolic Panel    CBC and Auto Differential    Hemoglobin A1C    Lipid Panel    Prostate Specific Antigen, Screen    Thyroxine, Free    Thyroid Stimulating Hormone    Testosterone, total and free    Obstructive sleep apnea    Relevant Orders    In-Center Sleep Study (Non-Sleep Provider)    Positive Airway Pressure (PAP) Therapy    Comprehensive Metabolic Panel    CBC and Auto Differential    Hemoglobin A1C    Lipid Panel    Prostate Specific Antigen, Screen    Thyroxine, Free    Thyroid Stimulating Hormone    Post-traumatic stress disorder, unspecified    Relevant Orders    Comprehensive Metabolic Panel    CBC and Auto Differential    Hemoglobin A1C    Lipid Panel    Prostate Specific Antigen, Screen    Thyroxine, Free    Thyroid Stimulating Hormone    Depression, major    Relevant Orders    Comprehensive Metabolic Panel    CBC and Auto Differential    Hemoglobin A1C    Lipid Panel    Prostate Specific Antigen, Screen    Thyroxine, Free    Thyroid Stimulating Hormone     Other Visit Diagnoses         Annual physical exam        Relevant Orders    Comprehensive Metabolic Panel    CBC and Auto Differential    Hemoglobin A1C    Lipid Panel    Prostate Specific Antigen, Screen    Thyroxine, Free    Thyroid Stimulating Hormone          Labs due  [facility]  Reqs cialis  This medications risks, benefits, and alternatives were discussed with patient at length.  If any unwanted side effects occur-discontinue medicine and call the office for discussion.  [facility] seeing [facility]    Sonido Castillo -be aware that any referrals discussed should be placed today or tests/labs ordered should result in prompt scheduling today.   If not done today-then a phone call for scheduling is expected in a  timely manner(within 2 weeks).   If testing is to be done-a result should be available to patient within 2 weeks time unless otherwise specified.   You, the patient or caregiver, are responsible for making sure what was discussed is actually scheduled and completed.  If suboptimal understanding of results of tests or referral reason-a follow up appointment with me should be made.  If above does NOT occur-you are to connect with us for an explanation.  See me 6mo    Follow up at next scheduled visit -as planned or directed today.  Sooner if new or unresolved issues of concern.    Sonido Castillo We know you have a choice for your health care, THANK YOU for choosing  and [facility].  We APPRECIATE YOU.  Sincerely,   Alessandra Yepez MD   (dr. Garcia)             [1]   Patient Active Problem List  Diagnosis    Decreased hearing of both ears    Elevated liver enzymes    Erectile dysfunction    Essential hypertension, benign    Hematochezia    Hyperglycemia    Hyperlipidemia    Hypogonadism male    Obstructive sleep apnea    Post-traumatic stress disorder, unspecified    Depression, major   [2]   Past Surgical History:  Procedure Laterality Date    COLONOSCOPY  2024    polyps another in 2026 and hx 2011    FINGER AMPUTATION  1977    HERNIA REPAIR  2001    RHINOPLASTY  1994    ROTATOR CUFF REPAIR  2008    VARICOCELECTOMY  2005    VASECTOMY  2010

## 2025-07-16 ENCOUNTER — APPOINTMENT (OUTPATIENT)
Dept: PRIMARY CARE | Facility: CLINIC | Age: 60
End: 2025-07-16
Payer: COMMERCIAL

## 2025-08-04 ENCOUNTER — TELEPHONE (OUTPATIENT)
Dept: PRIMARY CARE | Facility: CLINIC | Age: 60
End: 2025-08-04
Payer: COMMERCIAL

## 2025-09-24 ENCOUNTER — APPOINTMENT (OUTPATIENT)
Dept: PRIMARY CARE | Facility: CLINIC | Age: 60
End: 2025-09-24
Payer: COMMERCIAL

## 2026-01-16 ENCOUNTER — APPOINTMENT (OUTPATIENT)
Dept: PRIMARY CARE | Facility: CLINIC | Age: 61
End: 2026-01-16
Payer: COMMERCIAL